# Patient Record
Sex: FEMALE | Race: WHITE
[De-identification: names, ages, dates, MRNs, and addresses within clinical notes are randomized per-mention and may not be internally consistent; named-entity substitution may affect disease eponyms.]

---

## 2018-03-26 ENCOUNTER — HOSPITAL ENCOUNTER (OUTPATIENT)
Dept: HOSPITAL 53 - M LAB | Age: 23
End: 2018-03-26
Attending: FAMILY MEDICINE
Payer: COMMERCIAL

## 2018-03-26 DIAGNOSIS — R94.6: ICD-10-CM

## 2018-03-26 DIAGNOSIS — R23.0: Primary | ICD-10-CM

## 2018-03-26 LAB
FREE T4: 0.79 NG/DL (ref 0.76–1.46)
THYROID STIMULATING HORMONE: 5.36 UIU/ML (ref 0.36–3.74)

## 2018-03-26 PROCEDURE — 84443 ASSAY THYROID STIM HORMONE: CPT

## 2018-03-28 LAB — THYROPEROXIDASE AB SERPL IA-ACNC: > 1300 U/ML (ref ?–60)

## 2018-05-24 ENCOUNTER — HOSPITAL ENCOUNTER (OUTPATIENT)
Dept: HOSPITAL 53 - M SMT | Age: 23
End: 2018-05-24
Attending: PHYSICIAN ASSISTANT
Payer: COMMERCIAL

## 2018-05-24 DIAGNOSIS — E03.9: Primary | ICD-10-CM

## 2018-05-24 LAB
FREE T4: 1.3 NG/DL (ref 0.76–1.46)
THYROID STIMULATING HORMONE: 0.1 UIU/ML (ref 0.36–3.74)

## 2018-05-24 PROCEDURE — 84443 ASSAY THYROID STIM HORMONE: CPT

## 2018-10-30 ENCOUNTER — HOSPITAL ENCOUNTER (OUTPATIENT)
Dept: HOSPITAL 53 - M LAB REF | Age: 23
End: 2018-10-30
Attending: FAMILY MEDICINE
Payer: COMMERCIAL

## 2018-10-30 DIAGNOSIS — Z11.3: Primary | ICD-10-CM

## 2018-10-30 LAB
CHLAMYDIA DNA AMPLIFICATION: NEGATIVE
GC DNA AMPLIFICATION: NEGATIVE

## 2018-11-29 ENCOUNTER — HOSPITAL ENCOUNTER (OUTPATIENT)
Dept: HOSPITAL 53 - M SMT | Age: 23
End: 2018-11-29
Attending: FAMILY MEDICINE
Payer: COMMERCIAL

## 2018-11-29 DIAGNOSIS — E03.9: Primary | ICD-10-CM

## 2018-11-29 LAB
FREE T4: 1.5 NG/DL (ref 0.76–1.46)
THYROID STIMULATING HORMONE: 0.94 UIU/ML (ref 0.36–3.74)

## 2018-11-29 PROCEDURE — 84443 ASSAY THYROID STIM HORMONE: CPT

## 2020-08-11 ENCOUNTER — HOSPITAL ENCOUNTER (OUTPATIENT)
Dept: HOSPITAL 53 - M LAB | Age: 25
End: 2020-08-11
Attending: NURSE PRACTITIONER
Payer: COMMERCIAL

## 2020-08-11 DIAGNOSIS — E06.3: Primary | ICD-10-CM

## 2020-09-25 ENCOUNTER — HOSPITAL ENCOUNTER (OUTPATIENT)
Dept: HOSPITAL 53 - M RAD | Age: 25
End: 2020-09-25
Attending: FAMILY MEDICINE
Payer: COMMERCIAL

## 2020-09-25 DIAGNOSIS — M25.551: ICD-10-CM

## 2020-09-25 DIAGNOSIS — R07.81: Primary | ICD-10-CM

## 2020-09-25 DIAGNOSIS — M54.5: ICD-10-CM

## 2020-09-25 LAB
T4 FREE SERPL-MCNC: 1.16 NG/DL (ref 0.76–1.46)
TSH SERPL DL<=0.005 MIU/L-ACNC: 1.13 UIU/ML (ref 0.36–3.74)

## 2020-09-30 NOTE — REP
BILATERAL RIB SERIES 



HISTORY: Pleurodynia. Pain. 



TECHNIQUE: Four views of the bilateral ribs are performed.



FINDINGS: 

No fracture or bone lesion is seen. An accompanying view of the chest 
demonstrates no acute infiltrate. Heart and mediastinum are within normal 
limits.   



IMPRESSION: 

Negative bilateral rib series.

MTDD

## 2020-09-30 NOTE — REP
LUMBOSACRAL SPINE SERIES 



HISTORY: Low back pain. 



TECHNIQUE: Five views of the lumbosacral spine are performed. 



FINDINGS: 

There is no compression fracture. Vertebral bodies are normal in height and are 
well aligned with normal lumbar lordosis. No significant degenerative disc 
changes are seen. Posterior elements are intact.   



IMPRESSION: 

Essentially negative lumbosacral spine series.

MTDD

## 2020-09-30 NOTE — REP
RIGHT HIP SERIES 



HISTORY: Pain. 



TECHNIQUE: Two views of the right hip are performed. 



FINDINGS: 

There is no evidence of acute fracture, dislocation, or intrinsic bone disease. 
I do not see significant arthritic change at the hip joint.  



IMPRESSION: 

Essentially negative right hip series.

MTDD